# Patient Record
Sex: FEMALE | Race: WHITE | NOT HISPANIC OR LATINO | Employment: FULL TIME | URBAN - METROPOLITAN AREA
[De-identification: names, ages, dates, MRNs, and addresses within clinical notes are randomized per-mention and may not be internally consistent; named-entity substitution may affect disease eponyms.]

---

## 2021-08-21 ENCOUNTER — IMMUNIZATION (OUTPATIENT)
Dept: VACCINE CLINIC | Facility: HOSPITAL | Age: 21
End: 2021-08-21

## 2021-08-21 PROCEDURE — 0001A: CPT | Performed by: INTERNAL MEDICINE

## 2021-08-21 PROCEDURE — 91300 HC SARSCOV02 VAC 30MCG/0.3ML IM: CPT | Performed by: INTERNAL MEDICINE

## 2021-09-11 ENCOUNTER — IMMUNIZATION (OUTPATIENT)
Dept: VACCINE CLINIC | Facility: HOSPITAL | Age: 21
End: 2021-09-11

## 2021-09-11 PROCEDURE — 91300 HC SARSCOV02 VAC 30MCG/0.3ML IM: CPT | Performed by: INTERNAL MEDICINE

## 2021-09-11 PROCEDURE — 0002A: CPT | Performed by: INTERNAL MEDICINE

## 2025-06-05 ENCOUNTER — OFFICE VISIT (OUTPATIENT)
Dept: FAMILY MEDICINE CLINIC | Facility: CLINIC | Age: 25
End: 2025-06-05
Payer: COMMERCIAL

## 2025-06-05 VITALS
HEART RATE: 83 BPM | OXYGEN SATURATION: 97 % | SYSTOLIC BLOOD PRESSURE: 114 MMHG | DIASTOLIC BLOOD PRESSURE: 70 MMHG | WEIGHT: 193.4 LBS | BODY MASS INDEX: 32.22 KG/M2 | HEIGHT: 65 IN

## 2025-06-05 DIAGNOSIS — E78.2 MIXED HYPERLIPIDEMIA: ICD-10-CM

## 2025-06-05 DIAGNOSIS — D50.9 IRON DEFICIENCY ANEMIA, UNSPECIFIED IRON DEFICIENCY ANEMIA TYPE: ICD-10-CM

## 2025-06-05 DIAGNOSIS — Z83.3 FAMILY HISTORY OF DIABETES MELLITUS: Primary | ICD-10-CM

## 2025-06-05 NOTE — PROGRESS NOTES
Subjective     Shala Armstrong is a 25 y.o. female. Presents today for   Chief Complaint   Patient presents with    Annual Exam     Wants labs to check Iron level   Tried donating Blood and they told her iron was too high       HPI:  Angi Armstrong is a 25-year-old female presenting today with complaint of high iron new patient here to establish care    History of Present Illness  The patient presents for a regular checkup and iron level management.    She has not undergone a routine health examination in several years. Recently, she attempted to donate blood but was informed that her iron levels were elevated. This is a new finding as her iron levels were within normal range during her last donation in November 2024. She reports no other health concerns at this time. She has a family history of diabetes in her maternal grandfather and both her grandfathers passed away from strokes. She reports no issues with bowel movements or urination. She experienced constipation recently, which she attributes to pain medication taken for wisdom teeth extraction, but this has since resolved. Her menstrual cycles are regular, although heavy, and are managed with oral contraceptives. She describes herself as an anxious individual.    She has a history of eardrum rupture dating back to her elementary school years, which she believes was caused by an ear infection. She frequently uses earbuds in one ear and has attempted to clean her ears with Q-tips, but without success.    FAMILY HISTORY  Her maternal grandfather had type 1 diabetes. Both of her grandfathers passed away from strokes. She does not have any family history of heart disease.         There is no problem list on file for this patient.    History reviewed. No pertinent past medical history.  Past Surgical History:   Procedure Laterality Date    ADENOIDECTOMY      TONSILLECTOMY       Family History   Problem Relation Age of Onset    No Known Problems Mother     No Known  "Problems Father      Social History     Socioeconomic History    Marital status: Single   Tobacco Use    Smoking status: Never    Smokeless tobacco: Never   Vaping Use    Vaping status: Never Used   Substance and Sexual Activity    Alcohol use: Never    Drug use: Never    Sexual activity: Yes     Partners: Male     Birth control/protection: Birth control pill       No Known Allergies    Current Outpatient Medications on File Prior to Visit   Medication Sig Dispense Refill    chlorhexidine (Peridex) 0.12 % solution swish and spit 15 mls for 2 mins Twice Daily for 7-10 days 473 mL 0    levonorgestrel-ethinyl estradiol (AVIANE,ALESSE,LESSINA) 0.1-20 MG-MCG per tablet Take 1 tablet by mouth Daily. 84 tablet 4    [DISCONTINUED] amoxicillin (AMOXIL) 500 MG capsule Take 1 capsule by mouth 3 times a day until gone. 21 capsule 0    [DISCONTINUED] HYDROcodone-acetaminophen (NORCO) 5-325 MG per tablet Take 1 tablet by mouth Every 6 (Six) Hours As Needed for Pain. 12 tablet 0    [DISCONTINUED] ibuprofen (ADVIL,MOTRIN) 800 MG tablet Take 1 tablet by mouth Every 8 (Eight) Hours As Needed for Pain. 30 tablet 0    [DISCONTINUED] levonorgestrel-ethinyl estradiol (AVIANE,ALESSE,LESSINA) 0.1-20 MG-MCG per tablet Take 1 tablet by mouth Daily. 84 tablet 4     No current facility-administered medications on file prior to visit.       Objective   Vitals:    06/05/25 1026   BP: 114/70   Pulse: 83   SpO2: 97%   Weight: 87.7 kg (193 lb 6.4 oz)   Height: 163.8 cm (64.5\")     Body mass index is 32.68 kg/m².  Physical Exam  Constitutional:       Appearance: Normal appearance.   HENT:      Head: Normocephalic and atraumatic.      Ears:      Comments: Cerumen impaction bilaterally.     Mouth/Throat:      Mouth: Mucous membranes are moist.   Eyes:      Pupils: Pupils are equal, round, and reactive to light.   Cardiovascular:      Rate and Rhythm: Normal rate and regular rhythm.      Pulses: Normal pulses.      Heart sounds: Normal heart sounds. "   Pulmonary:      Effort: Pulmonary effort is normal.      Breath sounds: Normal breath sounds.   Genitourinary:     Comments: Mensturation controlled with bc pills  Skin:     Capillary Refill: Capillary refill takes less than 2 seconds.   Neurological:      General: No focal deficit present.      Mental Status: She is alert.      Comments: CN II-XII grossly intact on exam AEB following finger with eyes, puffing cheeks, sticking out tongue, wiggling tongue, raising eyebrows, and shrugging shoulders.         Physical Exam  Both ears are impacted with cerumen. Oral exam was performed.  Lungs sound normal.  Heart sounds normal.  Neurologic exam was performed.    Results         Procedures     Assessment & Plan   Diagnoses and all orders for this visit:    1. Family history of diabetes mellitus (Primary)  -     CBC & Differential  -     Hemoglobin A1c  -     Comprehensive Metabolic Panel    2. Mixed hyperlipidemia  -     Lipid Panel    3. Iron deficiency anemia, unspecified iron deficiency anemia type  -     Iron Profile w/o Ferritin  -     Ferritin         Assessment & Plan  1. Routine health maintenance.  She has not had a regular checkup in years. A comprehensive set of laboratory tests will be conducted today, including a complete blood count, complete metabolic panel, lipid panel, and iron level assessment.    2. Elevated iron levels.  She reported that her iron levels were too high when she tried donating blood recently. Blood work will be done today to assess her current iron levels.    3. Impacted cerumen.  She has a history of eardrum rupture and significant earwax buildup. The impacted cerumen was removed using a curette due to the history of eardrum rupture. She was advised to allow water to run into her ears during showers and subsequently dry them with a towel to help keep them clean.    PROCEDURE  Cerumen was removed from both ears using a curette.        BMI cannot be calculated due to outdated height or  weight values.  Please input a current height/weight in Vitals and re-renter BMIFOLLOWUP in Note to pull in correct documentation based on BMI range.     No follow-ups on file.     Discussed Care Gaps, ordered referrals and encouraged vaccination updates.       - Pt agrees with plan of care and denies further questions/concerns today  - This document is intended for medical expert use only. Persons  reading this document without medical staff guidance may result in misinterpretation and unintended morbidity     Go to the ER for any possible life-threatening symptoms such as chest pain or shortness of air.      Please allow 3-5 business days for recommendations based on new results      I personally spent time with this patient, preparing for the visit, reviewing tests, obtaining and/or reviewing a separately obtained history, performing a medically appropriate examination and/or evaluation, counseling and educating the patient/family/caregiver, ordering medications,  documenting information in the medical record and indepentently interpreting results.       Patient or patient representative verbalized consent for the use of Ambient Listening during the visit with  MARGARITA Monson for chart documentation. 6/5/2025  12:45 EDT

## 2025-06-06 ENCOUNTER — RESULTS FOLLOW-UP (OUTPATIENT)
Dept: FAMILY MEDICINE CLINIC | Facility: CLINIC | Age: 25
End: 2025-06-06
Payer: COMMERCIAL

## 2025-06-06 LAB
ALBUMIN SERPL-MCNC: 4.4 G/DL (ref 3.5–5.2)
ALBUMIN/GLOB SERPL: 1.7 G/DL
ALP SERPL-CCNC: 77 U/L (ref 39–117)
ALT SERPL-CCNC: 30 U/L (ref 1–33)
AST SERPL-CCNC: 22 U/L (ref 1–32)
BASOPHILS # BLD AUTO: 0.04 10*3/MM3 (ref 0–0.2)
BASOPHILS NFR BLD AUTO: 0.9 % (ref 0–1.5)
BILIRUB SERPL-MCNC: 0.3 MG/DL (ref 0–1.2)
BUN SERPL-MCNC: 11 MG/DL (ref 6–20)
BUN/CREAT SERPL: 16.4 (ref 7–25)
CALCIUM SERPL-MCNC: 9.1 MG/DL (ref 8.6–10.5)
CHLORIDE SERPL-SCNC: 107 MMOL/L (ref 98–107)
CHOLEST SERPL-MCNC: 176 MG/DL (ref 0–200)
CO2 SERPL-SCNC: 23.3 MMOL/L (ref 22–29)
CREAT SERPL-MCNC: 0.67 MG/DL (ref 0.57–1)
EGFRCR SERPLBLD CKD-EPI 2021: 124.6 ML/MIN/1.73
EOSINOPHIL # BLD AUTO: 0.13 10*3/MM3 (ref 0–0.4)
EOSINOPHIL NFR BLD AUTO: 3 % (ref 0.3–6.2)
ERYTHROCYTE [DISTWIDTH] IN BLOOD BY AUTOMATED COUNT: 12 % (ref 12.3–15.4)
FERRITIN SERPL-MCNC: 27.6 NG/ML (ref 13–150)
GLOBULIN SER CALC-MCNC: 2.6 GM/DL
GLUCOSE SERPL-MCNC: 84 MG/DL (ref 65–99)
HBA1C MFR BLD: 4.9 % (ref 4.8–5.6)
HCT VFR BLD AUTO: 41.9 % (ref 34–46.6)
HDLC SERPL-MCNC: 60 MG/DL (ref 40–60)
HGB BLD-MCNC: 13.4 G/DL (ref 12–15.9)
IMM GRANULOCYTES # BLD AUTO: 0.01 10*3/MM3 (ref 0–0.05)
IMM GRANULOCYTES NFR BLD AUTO: 0.2 % (ref 0–0.5)
IRON SATN MFR SERPL: 14 % (ref 20–50)
IRON SERPL-MCNC: 59 MCG/DL (ref 37–145)
LDLC SERPL CALC-MCNC: 108 MG/DL (ref 0–100)
LYMPHOCYTES # BLD AUTO: 1.56 10*3/MM3 (ref 0.7–3.1)
LYMPHOCYTES NFR BLD AUTO: 35.8 % (ref 19.6–45.3)
MCH RBC QN AUTO: 29.9 PG (ref 26.6–33)
MCHC RBC AUTO-ENTMCNC: 32 G/DL (ref 31.5–35.7)
MCV RBC AUTO: 93.5 FL (ref 79–97)
MONOCYTES # BLD AUTO: 0.34 10*3/MM3 (ref 0.1–0.9)
MONOCYTES NFR BLD AUTO: 7.8 % (ref 5–12)
NEUTROPHILS # BLD AUTO: 2.28 10*3/MM3 (ref 1.7–7)
NEUTROPHILS NFR BLD AUTO: 52.3 % (ref 42.7–76)
NRBC BLD AUTO-RTO: 0 /100 WBC (ref 0–0.2)
PLATELET # BLD AUTO: 265 10*3/MM3 (ref 140–450)
POTASSIUM SERPL-SCNC: 4.5 MMOL/L (ref 3.5–5.2)
PROT SERPL-MCNC: 7 G/DL (ref 6–8.5)
RBC # BLD AUTO: 4.48 10*6/MM3 (ref 3.77–5.28)
SODIUM SERPL-SCNC: 141 MMOL/L (ref 136–145)
TIBC SERPL-MCNC: 435 MCG/DL
TRIGL SERPL-MCNC: 40 MG/DL (ref 0–150)
UIBC SERPL-MCNC: 376 MCG/DL (ref 112–346)
VLDLC SERPL CALC-MCNC: 8 MG/DL (ref 5–40)
WBC # BLD AUTO: 4.36 10*3/MM3 (ref 3.4–10.8)

## 2025-06-06 RX ORDER — FERROUS GLUCONATE 324(38)MG
324 TABLET ORAL
Qty: 90 TABLET | Refills: 3 | Status: SHIPPED | OUTPATIENT
Start: 2025-06-06

## 2025-06-06 NOTE — PROGRESS NOTES
Your complete blood cell count is fine at this time. your LDL cholesterol is just a little bit elevated at 108 we would like to see it below 100 try working on diet and exercise 45 minutes a day 5 days a week to see if we can normalize this level.  Your hemoglobin A1c is within normal limits.  Your kidney and liver functions are within normal limits.  It appears you are slightly anemic so I am going to start you on some ferrous gluconate it is a little bit gentler on the stomach.

## 2025-06-06 NOTE — LETTER
Shala Armstrong  [address] [place] [address]    June 6, 2025     Dear Ms. Armstrong:    Below are the results from your recent visit:    Your complete blood cell count is fine at this time. your LDL cholesterol is just a little bit elevated at 108 we would like to see it below 100 try working on diet and exercise 45 minutes a day 5 days a week to see if we can normalize this level.  Your hemoglobin A1c is within normal limits.  Your kidney and liver functions are within normal limits.  It appears you are slightly anemic so I am going to start you on some ferrous gluconate it is a little bit gentler on the stomach.           Resulted Orders   CBC & Differential   Result Value Ref Range    WBC 4.36 3.40 - 10.80 10*3/mm3    RBC 4.48 3.77 - 5.28 10*6/mm3    Hemoglobin 13.4 12.0 - 15.9 g/dL    Hematocrit 41.9 34.0 - 46.6 %    MCV 93.5 79.0 - 97.0 fL    MCH 29.9 26.6 - 33.0 pg    MCHC 32.0 31.5 - 35.7 g/dL    RDW 12.0 (L) 12.3 - 15.4 %    Platelets 265 140 - 450 10*3/mm3    Neutrophil Rel % 52.3 42.7 - 76.0 %    Lymphocyte Rel % 35.8 19.6 - 45.3 %    Monocyte Rel % 7.8 5.0 - 12.0 %    Eosinophil Rel % 3.0 0.3 - 6.2 %    Basophil Rel % 0.9 0.0 - 1.5 %    Neutrophils Absolute 2.28 1.70 - 7.00 10*3/mm3    Lymphocytes Absolute 1.56 0.70 - 3.10 10*3/mm3    Monocytes Absolute 0.34 0.10 - 0.90 10*3/mm3    Eosinophils Absolute 0.13 0.00 - 0.40 10*3/mm3    Basophils Absolute 0.04 0.00 - 0.20 10*3/mm3    Immature Granulocyte Rel % 0.2 0.0 - 0.5 %    Immature Grans Absolute 0.01 0.00 - 0.05 10*3/mm3    nRBC 0.0 0.0 - 0.2 /100 WBC   Hemoglobin A1c   Result Value Ref Range    Hemoglobin A1C 4.90 4.80 - 5.60 %      Comment:      Hemoglobin A1C Ranges:  Increased Risk for Diabetes  5.7% to 6.4%  Diabetes                     >= 6.5%  Diabetic Goal                < 7.0%     Comprehensive Metabolic Panel   Result Value Ref Range    Glucose 84 65 - 99 mg/dL    BUN 11.0 6.0 - 20.0 mg/dL    Creatinine 0.67 0.57 - 1.00 mg/dL    EGFR  Result 124.6 >60.0 mL/min/1.73      Comment:      GFR Categories in Chronic Kidney Disease (CKD)  GFR Category          GFR (mL/min/1.73)    Interpretation  G1                    90 or greater        Normal or high  (1)  G2                    60-89                Mild decrease  (1)  G3a                   45-59                Mild to moderate  decrease  G3b                   30-44                Moderate to  severe decrease  G4                    15-29                Severe decrease  G5                    14 or less           Kidney failure  (1)In the absence of evidence of kidney disease, neither  GFR category G1 or G2 fulfill the criteria for CKD.  eGFR calculation 2021 CKD-EPI creatinine equation, which  does not include race as a factor      BUN/Creatinine Ratio 16.4 7.0 - 25.0    Sodium 141 136 - 145 mmol/L    Potassium 4.5 3.5 - 5.2 mmol/L    Chloride 107 98 - 107 mmol/L    Total CO2 23.3 22.0 - 29.0 mmol/L    Calcium 9.1 8.6 - 10.5 mg/dL    Total Protein 7.0 6.0 - 8.5 g/dL    Albumin 4.4 3.5 - 5.2 g/dL    Globulin 2.6 gm/dL    A/G Ratio 1.7 g/dL    Total Bilirubin 0.3 0.0 - 1.2 mg/dL    Alkaline Phosphatase 77 39 - 117 U/L    AST (SGOT) 22 1 - 32 U/L    ALT (SGPT) 30 1 - 33 U/L   Lipid Panel   Result Value Ref Range    Total Cholesterol 176 0 - 200 mg/dL      Comment:      Cholesterol Reference Ranges  (U.S. Department of Health and Human Services ATP III  Classifications)  Desirable          <200 mg/dL  Borderline High    200-239 mg/dL  High Risk          >240 mg/dL  Triglyceride Reference Ranges  (U.S. Department of Health and Human Services ATP III  Classifications)  Normal           <150 mg/dL  Borderline High  150-199 mg/dL  High             200-499 mg/dL  Very High        >500 mg/dL  HDL Reference Ranges  (U.S. Department of Health and Human Services ATP III  Classifications)  Low     <40 mg/dl (major risk factor for CHD)  High    >60 mg/dl ('negative' risk factor for CHD)  LDL Reference Ranges  (U.S.  Department of Health and Human Services ATP III  Classifications)  Optimal          <100 mg/dL  Near Optimal     100-129 mg/dL  Borderline High  130-159 mg/dL  High             160-189 mg/dL  Very High        >189 mg/dL  LDL is calculated using the NIH LDL-C calculation.      Triglycerides 40 0 - 150 mg/dL    HDL Cholesterol 60 40 - 60 mg/dL    VLDL Cholesterol Ezequiel 8 5 - 40 mg/dL    LDL Chol Calc (NIH) 108 (H) 0 - 100 mg/dL   Iron Profile w/o Ferritin   Result Value Ref Range    TIBC 435 mcg/dL    UIBC 376 (H) 112 - 346 mcg/dL    Iron 59 37 - 145 mcg/dL    Iron Saturation 14 (L) 20 - 50 %   Ferritin   Result Value Ref Range    Ferritin 27.60 13.00 - 150.00 ng/mL      Comment:      Results may be falsely decreased if patient taking Biotin.       If you have any questions or concerns, please don't hesitate to call.         Sincerely,        MARGARITA Monson

## 2025-06-23 ENCOUNTER — TELEPHONE (OUTPATIENT)
Dept: FAMILY MEDICINE CLINIC | Facility: CLINIC | Age: 25
End: 2025-06-23
Payer: COMMERCIAL

## 2025-06-23 RX ORDER — FERROUS GLUCONATE 324(38)MG
324 TABLET ORAL
Qty: 90 TABLET | Refills: 3 | Status: SHIPPED | OUTPATIENT
Start: 2025-06-23

## 2025-06-23 NOTE — TELEPHONE ENCOUNTER
Results letter was mailed 2 weeks ago but called and went over results with her. Resent the iron tablet because pharmacy never did call pt and make her aware there were any meds to . Pt is aware now and will  later.

## 2025-06-23 NOTE — TELEPHONE ENCOUNTER
Caller: Shala Armstrong    Relationship: Self    Best call back number: [phone]     Caller requesting test results:      What test was performed:      When was the test performed:      Where was the test performed:      Additional notes: PATIENT CALLED SHE HAD LABS DONE ON 6/05/25 AND HAVE THEY CAME BACK, IF SO CAN SHE GET THE RESULTS.  PLEASE CALL HER BACK.